# Patient Record
Sex: MALE | Race: BLACK OR AFRICAN AMERICAN | NOT HISPANIC OR LATINO | Employment: FULL TIME | URBAN - METROPOLITAN AREA
[De-identification: names, ages, dates, MRNs, and addresses within clinical notes are randomized per-mention and may not be internally consistent; named-entity substitution may affect disease eponyms.]

---

## 2018-05-01 ENCOUNTER — HOSPITAL ENCOUNTER (EMERGENCY)
Facility: HOSPITAL | Age: 28
Discharge: HOME | End: 2018-05-01
Attending: FAMILY MEDICINE
Payer: COMMERCIAL

## 2018-05-01 VITALS
SYSTOLIC BLOOD PRESSURE: 136 MMHG | WEIGHT: 202 LBS | HEART RATE: 67 BPM | RESPIRATION RATE: 16 BRPM | TEMPERATURE: 98 F | BODY MASS INDEX: 27.36 KG/M2 | HEIGHT: 72 IN | OXYGEN SATURATION: 100 % | DIASTOLIC BLOOD PRESSURE: 87 MMHG

## 2018-05-01 DIAGNOSIS — R53.82 CHRONIC FATIGUE: Primary | ICD-10-CM

## 2018-05-01 DIAGNOSIS — M79.10 MYALGIA: ICD-10-CM

## 2018-05-01 LAB
ANION GAP SERPL CALC-SCNC: 7 MMOL/L
BASOPHILS # BLD AUTO: 0.04 K/UL
BASOPHILS NFR BLD: 0.5 %
BILIRUB UR QL STRIP: NEGATIVE
BUN SERPL-MCNC: 13 MG/DL
CALCIUM SERPL-MCNC: 9 MG/DL
CHLORIDE SERPL-SCNC: 109 MMOL/L
CLARITY UR REFRACT.AUTO: ABNORMAL
CO2 SERPL-SCNC: 27 MMOL/L
COLOR UR AUTO: ABNORMAL
CREAT SERPL-MCNC: 0.9 MG/DL
DIFFERENTIAL METHOD: ABNORMAL
EOSINOPHIL # BLD AUTO: 0.5 K/UL
EOSINOPHIL NFR BLD: 5.2 %
ERYTHROCYTE [DISTWIDTH] IN BLOOD BY AUTOMATED COUNT: 13.4 %
EST. GFR  (AFRICAN AMERICAN): >60 ML/MIN/1.73 M^2
EST. GFR  (NON AFRICAN AMERICAN): >60 ML/MIN/1.73 M^2
GLUCOSE SERPL-MCNC: 112 MG/DL
GLUCOSE UR QL STRIP: NEGATIVE
HCT VFR BLD AUTO: 42.1 %
HGB BLD-MCNC: 13.2 G/DL
HGB UR QL STRIP: NEGATIVE
IMM GRANULOCYTES # BLD AUTO: 0.02 K/UL
IMM GRANULOCYTES NFR BLD AUTO: 0.2 %
KETONES UR QL STRIP: NEGATIVE
LEUKOCYTE ESTERASE UR QL STRIP: NEGATIVE
LYMPHOCYTES # BLD AUTO: 1.8 K/UL
LYMPHOCYTES NFR BLD: 20.5 %
MCH RBC QN AUTO: 26.1 PG
MCHC RBC AUTO-ENTMCNC: 31.4 G/DL
MCV RBC AUTO: 83 FL
MONOCYTES # BLD AUTO: 0.8 K/UL
MONOCYTES NFR BLD: 8.5 %
NEUTROPHILS # BLD AUTO: 5.7 K/UL
NEUTROPHILS NFR BLD: 65.1 %
NITRITE UR QL STRIP: NEGATIVE
NRBC BLD-RTO: 0 /100 WBC
PH UR STRIP: 7 [PH] (ref 5–8)
PLATELET # BLD AUTO: 329 K/UL
PMV BLD AUTO: 9.5 FL
POCT GLUCOSE: 102 MG/DL (ref 70–110)
POTASSIUM SERPL-SCNC: 4 MMOL/L
PROT UR QL STRIP: NEGATIVE
RBC # BLD AUTO: 5.06 M/UL
SODIUM SERPL-SCNC: 143 MMOL/L
SP GR UR STRIP: 1.03 (ref 1–1.03)
TSH SERPL DL<=0.005 MIU/L-ACNC: 0.87 UIU/ML
URN SPEC COLLECT METH UR: ABNORMAL
UROBILINOGEN UR STRIP-ACNC: 2 EU/DL
WBC # BLD AUTO: 8.78 K/UL

## 2018-05-01 PROCEDURE — 85025 COMPLETE CBC W/AUTO DIFF WBC: CPT

## 2018-05-01 PROCEDURE — 84443 ASSAY THYROID STIM HORMONE: CPT

## 2018-05-01 PROCEDURE — 80048 BASIC METABOLIC PNL TOTAL CA: CPT

## 2018-05-01 PROCEDURE — 99283 EMERGENCY DEPT VISIT LOW MDM: CPT | Mod: 25

## 2018-05-01 PROCEDURE — 99282 EMERGENCY DEPT VISIT SF MDM: CPT | Mod: ,,, | Performed by: PHYSICIAN ASSISTANT

## 2018-05-01 PROCEDURE — 25000003 PHARM REV CODE 250: Performed by: PHYSICIAN ASSISTANT

## 2018-05-01 PROCEDURE — 96360 HYDRATION IV INFUSION INIT: CPT

## 2018-05-01 PROCEDURE — 81003 URINALYSIS AUTO W/O SCOPE: CPT

## 2018-05-01 PROCEDURE — 82962 GLUCOSE BLOOD TEST: CPT

## 2018-05-01 RX ADMIN — SODIUM CHLORIDE 1000 ML: 0.9 INJECTION, SOLUTION INTRAVENOUS at 12:05

## 2018-05-01 NOTE — ED PROVIDER NOTES
"Encounter Date: 5/1/2018       History     Chief Complaint   Patient presents with    Generalized Body Aches     tired for months, i got dizzy today at work     26 yo  male with hx of migraine presents today to ED with complain of fatigue, generalized body aches, intermittent headaches, dizziness. As per pt symptoms for more than a month, constant fatigue, on/off generalized body aches, intermittent frontal headaches, unchanged in the past month- pt denies any headaches today. Mother present at ED and worry because Mr Woodruff has been complaining of lack of energy for more than a month, pt usually goes to bed around 9pm and wakes up around 6 am. Pt works at the [employer]. He missed work yesterday but went today. Pt was at work and felt dizzy "like passing out" Pt was sent to ED for further evaluation. Pt states dizziness resolved since he is been waiting at ED. Denies visual changes, speech difficulties, weakness/numbness of extremities, chest pain, sob, abdominal pain, nausea, vomiting, syncope, leg swelling       The history is provided by the patient and a parent. No  was used.     Review of patient's allergies indicates:  No Known Allergies  History reviewed. No pertinent past medical history.  History reviewed. No pertinent surgical history.  Family History   Problem Relation Age of Onset    Migraines Mother      Social History   Substance Use Topics    Smoking status: Never Smoker    Smokeless tobacco: Never Used    Alcohol use No     Review of Systems   Constitutional: Positive for fatigue. Negative for activity change, appetite change, chills, fever and unexpected weight change.   HENT: Negative for rhinorrhea.    Eyes: Negative for visual disturbance.   Respiratory: Negative for cough and shortness of breath.    Cardiovascular: Negative for chest pain and palpitations.   Gastrointestinal: Negative for abdominal pain, diarrhea, nausea and vomiting.   Musculoskeletal: " Positive for myalgias.   Skin: Negative for rash.   Neurological: Positive for dizziness, light-headedness and headaches. Negative for tremors, seizures, syncope, facial asymmetry, speech difficulty, weakness and numbness.   Psychiatric/Behavioral: Negative for self-injury and suicidal ideas.       Physical Exam     Initial Vitals [05/01/18 1040]   BP Pulse Resp Temp SpO2   131/68 76 16 98.4 °F (36.9 °C) 99 %      MAP       89         Physical Exam    Nursing note and vitals reviewed.  Constitutional: He appears well-developed and well-nourished. No distress.   HENT:   Head: Normocephalic and atraumatic.   Eyes: EOM are normal. Pupils are equal, round, and reactive to light.   Neck: Normal range of motion.   Cardiovascular: Normal rate.   Pulmonary/Chest: Breath sounds normal. He has no wheezes. He has no rhonchi. He has no rales.   Abdominal: Soft. Bowel sounds are normal. There is no tenderness. There is no rebound.   Musculoskeletal: Normal range of motion. He exhibits no edema.   Neurological: He is alert and oriented to person, place, and time. He has normal strength. No cranial nerve deficit or sensory deficit. He displays a negative Romberg sign. GCS eye subscore is 4. GCS verbal subscore is 5. GCS motor subscore is 6.   Reflex Scores:       Patellar reflexes are 2+ on the right side and 2+ on the left side.  Skin: Skin is warm. Capillary refill takes less than 2 seconds. No rash noted.   Psychiatric: He has a normal mood and affect.         ED Course   Procedures  Labs Reviewed   CBC W/ AUTO DIFFERENTIAL - Abnormal; Notable for the following:        Result Value    Hemoglobin 13.2 (*)     MCH 26.1 (*)     MCHC 31.4 (*)     All other components within normal limits   BASIC METABOLIC PANEL - Abnormal; Notable for the following:     Glucose 112 (*)     Anion Gap 7 (*)     All other components within normal limits   URINALYSIS, REFLEX TO URINE CULTURE - Abnormal; Notable for the following:     Appearance, UA  Hazy (*)     All other components within normal limits    Narrative:     Preferred Collection Type->Urine, Clean Catch   TSH   POCT GLUCOSE                   APC / Resident Notes:   28 yo AAM with hx of migraine, presents with fatigue, body aches, headache, dizziness for more than a month. No headache symptoms today but pt reports fatigue, dizziness, and body ache, unchanged for more than a month. Denies weight loss, decreased appetite, fever, chills, nausea, vomiting. Physical exam: lungs clear to auscultation, abdomen benign, neuro intact, normal vital signs, no sign of distress  DDx included but not limited to anemia, electrolyte imbalance, thyroid disorder, viral illness, migraine  Order: cbc, bmp, UA, TSH, bolus. Reassess.  Labs: no leuk, Hg slightly low 13.2, glu 112, normal TSH  @1200 pt stable, asymptomatic, neuro intact  Discussed case with supervising physician. Pt will be discharged home with instructions to follow up with PCP for further evaluation on chronic fatigue. Detailed return precautions given to patient. Pt verbalized understanding.                    Clinical Impression:   The primary encounter diagnosis was Chronic fatigue. A diagnosis of Myalgia was also pertinent to this visit.    Disposition:   Disposition: Discharged  Condition: Stable                        Donna Wang PA-C  05/01/18 8038

## 2018-05-01 NOTE — ED TRIAGE NOTES
Presents to ER with weakness, headache, dizziness and generalized body aches for 1 month.  Pt identifiers checked and correct    LOC:   · The pt is awake, alert, and aware of environment with an appropriate affect,  as well as speaking appropriately; AAOx3 to person, place, and situation  APPEARANCE:   · Pt resting comfortably and in no acute distress; clean and well groomed  SKIN:   · Skin is warm and dry; color consistent with ethnicity; pt has normal skin turgor and moist mucus membranes  · Skin intact w/ no breakdown or brusing noted  MUSCULOSKELETAL:   · Pt moving all extremities well; absent of obvious deformities; Ambulates independently  RESPIRATORY:   · Airway is open and patent; respirations are spontaneous; normal effort and rate noted; absent of accessory-muscle use  · Breath sounds auscultated in all lung fields are noted to be clear and absent of adventitious sounds  CARDIAC:   · Pt denies chest pain; normal heart sounds auscultated; Pt has no peripheral edema noted; capillary refill < 3 seconds  · 2+ pulses palpated in all extremities   ABDOMEN:   · Soft and non-tender to palpation; no abnormal distention noted/reported; bowel sounds present x 4  NEUROLOGIC:   · PERRL, 3mm bilaterally, eyes open spontaneously; pt follows commands; facial expression symmetrical; equal hand  bilaterally; purposeful motor response noted  · Normal sensation to touch reported in distal region of all extremities

## 2018-05-01 NOTE — DISCHARGE INSTRUCTIONS
Follow up with your doctor for further evaluation. Call for appointment. Seek prompt medical attention if worsening of symptoms, any health concern, or new symptoms develop.

## 2018-05-09 ENCOUNTER — OFFICE VISIT (OUTPATIENT)
Dept: INTERNAL MEDICINE | Facility: CLINIC | Age: 28
End: 2018-05-09
Payer: COMMERCIAL

## 2018-05-09 VITALS
SYSTOLIC BLOOD PRESSURE: 126 MMHG | DIASTOLIC BLOOD PRESSURE: 88 MMHG | WEIGHT: 211.88 LBS | BODY MASS INDEX: 28.08 KG/M2 | HEIGHT: 73 IN | HEART RATE: 79 BPM

## 2018-05-09 DIAGNOSIS — Z86.69 HX OF MIGRAINE HEADACHES: ICD-10-CM

## 2018-05-09 DIAGNOSIS — R53.83 FATIGUE, UNSPECIFIED TYPE: Primary | ICD-10-CM

## 2018-05-09 DIAGNOSIS — M79.10 MYALGIA: ICD-10-CM

## 2018-05-09 PROCEDURE — 99999 PR PBB SHADOW E&M-EST. PATIENT-LVL III: CPT | Mod: PBBFAC,,,

## 2018-05-09 PROCEDURE — 3008F BODY MASS INDEX DOCD: CPT | Mod: CPTII,S$GLB,,

## 2018-05-09 PROCEDURE — 99203 OFFICE O/P NEW LOW 30 MIN: CPT | Mod: S$GLB,,,

## 2018-05-09 NOTE — PROGRESS NOTES
"Clinic Note  05/09/2018       Subjective:       Patient ID: Orlando Woodruff is a 27 y.o. male being seen for an urgent care visit.    Chief Complaint: Follow-up; Migraine; and Weakness      Migraine    Associated symptoms include photophobia. Pertinent negatives include no coughing, fever, nausea, sore throat or vomiting.     Mr. Woodruff is a 26 yo M with no significant PMHx who is being seen in clinic for follow up after an ED visit for fatigue, body aches, and headaches. Patient states that for the past 3-4 months he has felt more tired, particularly at the end of the day. [place]. 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 No heavy lifting. Fatigue does not interfere with his work duties. States that his body aches are limited to his legs. Describes the pain as an ache that feels as though it goes down to his bones and his legs feel "so tired like they want to give out." Denies low back pain or radiculopathy symptoms. Denies joint pain or swelling. No fever, chills, weight loss, nausea, vomiting, changes in stools, abdominal pain, changes in urine.     Wears heavy rubber steel-toe boots issued by his employer with thin athletic socks. States that he tries to stay hydrated but likely doesn't drink enough water during the day. Goes to sleep around 11 pm, wakes at 6. Does not snore, only occasionally doesn't feel rested upon waking. Works from 7-3:30. Comes home and cares for his 3 children, aged 8, 7, and 1. Lives with his girlfriend and mother who help with childcare. Good food intake throughout the day. Denies depressed mood or any significant emotional stress. Does not have many activities that he does for fun outside of work. No EtOH, tobacco or illicit drug use.    Does have a history of headache. Typically occur 1-2 times per month. Pain is sharp, sudden onset. Occurs in his bilateral eyes and has bandlike distribution around his head. +photophobia, " +phonophobia. No nausea/vomiting. Resolve with taking a nap in a dark, quiet room. Made worse by lack of sleep. Most recent headache was last week.      No past medical history on file.    No past surgical history on file.    Family History   Problem Relation Age of Onset    Migraines Mother        Social History     Social History    Marital status: Single     Spouse name: N/A    Number of children: N/A    Years of education: N/A     Social History Main Topics    Smoking status: Never Smoker    Smokeless tobacco: Never Used    Alcohol use No    Drug use: No    Sexual activity: Yes     Partners: Female     Other Topics Concern    Not on file     Social History Narrative    No narrative on file       Review of Systems   Constitutional: Positive for fatigue. Negative for chills, diaphoresis, fever and unexpected weight change.   HENT: Negative for congestion and sore throat.    Eyes: Positive for photophobia. Negative for visual disturbance.   Respiratory: Negative for cough and shortness of breath.    Cardiovascular: Negative for chest pain and palpitations.   Gastrointestinal: Negative for abdominal distention, constipation, diarrhea, nausea and vomiting.   Endocrine: Negative for polydipsia, polyphagia and polyuria.   Genitourinary: Negative for dysuria.   Musculoskeletal: Positive for myalgias. Negative for arthralgias.   Skin: Negative for wound.   Neurological: Positive for headaches.   Psychiatric/Behavioral: Negative for decreased concentration and dysphoric mood.       Patient's Medications   New Prescriptions    No medications on file   Previous Medications    NAPROXEN SODIUM (ANAPROX) 550 MG TABLET    Take 550 mg by mouth every 12 (twelve) hours.   Modified Medications    No medications on file   Discontinued Medications    No medications on file       Patient Active Problem List    Diagnosis Date Noted    Fatigue 05/09/2018           Objective:      /88 (BP Location: Left arm, Patient  "Position: Sitting, BP Method: Large (Manual))   Pulse 79   Ht 6' 1" (1.854 m)   Wt 96.1 kg (211 lb 13.8 oz)   BMI 27.95 kg/m²   Estimated body mass index is 27.95 kg/m² as calculated from the following:    Height as of this encounter: 6' 1" (1.854 m).    Weight as of this encounter: 96.1 kg (211 lb 13.8 oz).    Physical Exam   Constitutional: He is oriented to person, place, and time. He appears well-developed and well-nourished. No distress.   HENT:   Head: Normocephalic and atraumatic.   Mouth/Throat: Oropharynx is clear and moist. No oropharyngeal exudate.   Eyes: EOM are normal. No scleral icterus.   Neck: Normal range of motion.   Cardiovascular: Normal rate, regular rhythm, normal heart sounds and intact distal pulses.    Pulmonary/Chest: Effort normal and breath sounds normal.   Abdominal: Soft. He exhibits no distension. There is no tenderness.   Musculoskeletal: Normal range of motion. He exhibits no edema.   Neurological: He is alert and oriented to person, place, and time.   Skin: Skin is warm and dry. Capillary refill takes less than 2 seconds.   Psychiatric: He has a normal mood and affect. His behavior is normal.       Assessment:         1. Fatigue, unspecified type     2. Myalgia     3. Hx of migraine headaches           Plan:         1. Fatigue, unspecified type, myalgia  - Patient does not have symptoms consistent with depression, RUPERTO  - Lab workup at ED unremarkable including CBC, CMP, TSH   - Urinalysis consistent with mild dehydration and concentration  - Advised patient to try extra strength Tylenol before work, and once at work as needed  - Advised trying thick socks intended for boots as well as orthotic inserts  - If he does not feel like he is getting restful sleep, can try benadryl PRN  - RTC PRN      No orders of the defined types were placed in this encounter.            Patient seen and plan of care discussed with Dr. Stein.    Austyn Pizano MD  Internal Medicine, " PGY-1

## 2018-05-09 NOTE — PATIENT INSTRUCTIONS
Remember to try the thicker boot socks and shoe inserts to help support your legs.     Take 1 extra strength Tylenol before work, and you can take another one at work as you need it.    If you feel like your sleep isn't restful, you can try a Benadryl before bed to help improve your rest.    If you feel like nothing is improving in the next few weeks, please call the clinic and we can reschedule another appointment for you.

## 2018-09-21 ENCOUNTER — HOSPITAL ENCOUNTER (EMERGENCY)
Facility: HOSPITAL | Age: 28
Discharge: HOME | End: 2018-09-21
Attending: SURGERY
Payer: COMMERCIAL

## 2018-09-21 VITALS
SYSTOLIC BLOOD PRESSURE: 126 MMHG | DIASTOLIC BLOOD PRESSURE: 79 MMHG | BODY MASS INDEX: 27.74 KG/M2 | HEART RATE: 91 BPM | RESPIRATION RATE: 20 BRPM | TEMPERATURE: 98 F | OXYGEN SATURATION: 97 % | HEIGHT: 73 IN | WEIGHT: 209.31 LBS

## 2018-09-21 DIAGNOSIS — R21 RASH: ICD-10-CM

## 2018-09-21 DIAGNOSIS — J06.9 URI, ACUTE: Primary | ICD-10-CM

## 2018-09-21 LAB
DEPRECATED S PYO AG THROAT QL EIA: NEGATIVE
FLUAV AG SPEC QL IA: NEGATIVE
FLUBV AG SPEC QL IA: NEGATIVE
SPECIMEN SOURCE: NORMAL

## 2018-09-21 PROCEDURE — 96372 THER/PROPH/DIAG INJ SC/IM: CPT

## 2018-09-21 PROCEDURE — 87880 STREP A ASSAY W/OPTIC: CPT

## 2018-09-21 PROCEDURE — 99284 EMERGENCY DEPT VISIT MOD MDM: CPT | Mod: 25

## 2018-09-21 PROCEDURE — 87081 CULTURE SCREEN ONLY: CPT

## 2018-09-21 PROCEDURE — 87400 INFLUENZA A/B EACH AG IA: CPT

## 2018-09-21 PROCEDURE — 63600175 PHARM REV CODE 636 W HCPCS: Performed by: PHYSICIAN ASSISTANT

## 2018-09-21 PROCEDURE — 25000242 PHARM REV CODE 250 ALT 637 W/ HCPCS: Performed by: PHYSICIAN ASSISTANT

## 2018-09-21 PROCEDURE — 94640 AIRWAY INHALATION TREATMENT: CPT

## 2018-09-21 RX ORDER — KETOROLAC TROMETHAMINE 30 MG/ML
15 INJECTION, SOLUTION INTRAMUSCULAR; INTRAVENOUS
Status: COMPLETED | OUTPATIENT
Start: 2018-09-21 | End: 2018-09-21

## 2018-09-21 RX ORDER — HYDROCORTISONE VALERATE CREAM 2 MG/G
CREAM TOPICAL 2 TIMES DAILY
Qty: 15 G | Refills: 0 | Status: SHIPPED | OUTPATIENT
Start: 2018-09-21 | End: 2018-10-01

## 2018-09-21 RX ORDER — ALBUTEROL SULFATE 2.5 MG/.5ML
1.25 SOLUTION RESPIRATORY (INHALATION)
Status: COMPLETED | OUTPATIENT
Start: 2018-09-21 | End: 2018-09-21

## 2018-09-21 RX ORDER — ALBUTEROL SULFATE 90 UG/1
1-2 AEROSOL, METERED RESPIRATORY (INHALATION) EVERY 6 HOURS PRN
Qty: 1 INHALER | Refills: 0 | Status: SHIPPED | OUTPATIENT
Start: 2018-09-21 | End: 2020-03-16 | Stop reason: SDUPTHER

## 2018-09-21 RX ADMIN — ALBUTEROL SULFATE 1.28 MG: 2.5 SOLUTION RESPIRATORY (INHALATION) at 04:09

## 2018-09-21 RX ADMIN — KETOROLAC TROMETHAMINE 15 MG: 30 INJECTION, SOLUTION INTRAMUSCULAR at 04:09

## 2018-09-21 NOTE — DISCHARGE INSTRUCTIONS
Your strep test was negative. Your influenza test was negative. Upper respiratory infection of viral etiology.  You are instructed to follow up with her primary care provider for re-evaluation within 3 days.  You are instructed to return to the emergency department immediately for any new or worsening symptoms.

## 2018-09-24 LAB — BACTERIA THROAT CULT: NORMAL

## 2020-03-16 ENCOUNTER — HOSPITAL ENCOUNTER (EMERGENCY)
Facility: HOSPITAL | Age: 30
Discharge: HOME | End: 2020-03-16
Attending: EMERGENCY MEDICINE
Payer: COMMERCIAL

## 2020-03-16 VITALS
HEIGHT: 73 IN | OXYGEN SATURATION: 97 % | BODY MASS INDEX: 29.82 KG/M2 | WEIGHT: 225 LBS | HEART RATE: 87 BPM | TEMPERATURE: 99 F | SYSTOLIC BLOOD PRESSURE: 148 MMHG | DIASTOLIC BLOOD PRESSURE: 90 MMHG | RESPIRATION RATE: 18 BRPM

## 2020-03-16 DIAGNOSIS — J06.9 VIRAL URI WITH COUGH: Primary | ICD-10-CM

## 2020-03-16 DIAGNOSIS — R42 DIZZINESS: ICD-10-CM

## 2020-03-16 LAB
GROUP A STREP, MOLECULAR: NEGATIVE
INFLUENZA A, MOLECULAR: NEGATIVE
INFLUENZA B, MOLECULAR: NEGATIVE
SPECIMEN SOURCE: NORMAL

## 2020-03-16 PROCEDURE — 87651 STREP A DNA AMP PROBE: CPT | Mod: ER

## 2020-03-16 PROCEDURE — 93010 ELECTROCARDIOGRAM REPORT: CPT | Mod: ,,, | Performed by: INTERNAL MEDICINE

## 2020-03-16 PROCEDURE — 99284 EMERGENCY DEPT VISIT MOD MDM: CPT | Mod: 25,ER

## 2020-03-16 PROCEDURE — 93005 ELECTROCARDIOGRAM TRACING: CPT | Mod: ER

## 2020-03-16 PROCEDURE — 87502 INFLUENZA DNA AMP PROBE: CPT | Mod: ER

## 2020-03-16 PROCEDURE — 93010 EKG 12-LEAD: ICD-10-PCS | Mod: ,,, | Performed by: INTERNAL MEDICINE

## 2020-03-16 RX ORDER — ACETAMINOPHEN 500 MG
500 TABLET ORAL EVERY 8 HOURS PRN
Qty: 30 TABLET | Refills: 0 | Status: SHIPPED | OUTPATIENT
Start: 2020-03-16

## 2020-03-16 RX ORDER — BENZONATATE 100 MG/1
100 CAPSULE ORAL 3 TIMES DAILY PRN
Qty: 30 CAPSULE | Refills: 0 | Status: SHIPPED | OUTPATIENT
Start: 2020-03-16 | End: 2020-03-26

## 2020-03-16 RX ORDER — ALBUTEROL SULFATE 90 UG/1
1-2 AEROSOL, METERED RESPIRATORY (INHALATION) EVERY 6 HOURS PRN
Qty: 6.7 G | Refills: 0 | Status: SHIPPED | OUTPATIENT
Start: 2020-03-16 | End: 2021-03-16

## 2020-03-17 NOTE — ED PROVIDER NOTES
Encounter Date: 3/16/2020       History     Chief Complaint   Patient presents with    Sore Throat     Pt with c/o sore throat, headache, dizziness and cough that started today. Pt denies fever.     Headache    Dizziness    Cough     The patient is a 29 year-old male with a past medical history of migraines who presents to the ED complaining of sore throat, headache, dizziness, and cough that began today.  No known fever.  No known sick contacts.  No chest pain or shortness of breath.  Tolerating oral intake.  No treatment attempted prior to arrival.      The history is provided by the patient.     Review of patient's allergies indicates:  No Known Allergies  No past medical history on file.  No past surgical history on file.  Family History   Problem Relation Age of Onset    Migraines Mother      Social History     Tobacco Use    Smoking status: Never Smoker    Smokeless tobacco: Never Used   Substance Use Topics    Alcohol use: No    Drug use: No     Review of Systems   Constitutional: Negative for fever.   HENT: Positive for sore throat.    Respiratory: Positive for cough. Negative for shortness of breath.    Cardiovascular: Negative for chest pain.   Gastrointestinal: Negative for nausea.   Genitourinary: Negative for dysuria.   Musculoskeletal: Negative for back pain.   Skin: Negative for rash.   Neurological: Positive for dizziness and headaches. Negative for weakness.   Hematological: Does not bruise/bleed easily.       Physical Exam     Initial Vitals [03/16/20 2024]   BP Pulse Resp Temp SpO2   (!) 148/90 87 18 98.6 °F (37 °C) 97 %      MAP       --         Physical Exam    Nursing note and vitals reviewed.  Constitutional: He appears well-developed and well-nourished.  Non-toxic appearance. No distress.   The patient is alert, oriented, and speaking in complete sentences.  No apparent distress.  He is nontoxic-appearing.   HENT:   Head: Normocephalic and atraumatic.   Right Ear: Hearing, tympanic  membrane, external ear and ear canal normal.   Left Ear: Hearing, tympanic membrane, external ear and ear canal normal.   Nose: Mucosal edema present.   Mouth/Throat: Uvula is midline and mucous membranes are normal. Posterior oropharyngeal erythema present. No oropharyngeal exudate, posterior oropharyngeal edema or tonsillar abscesses.   No significant oropharyngeal edema.  No exudate.   Eyes: Conjunctivae and EOM are normal.   Neck: Full passive range of motion without pain. Neck supple.   No meningismus   Cardiovascular: Normal rate, regular rhythm, normal heart sounds and normal pulses. Exam reveals no gallop and no friction rub.    No murmur heard.  Pulmonary/Chest: Effort normal and breath sounds normal. No respiratory distress. He has no wheezes. He has no rhonchi. He has no rales.   Musculoskeletal: Normal range of motion.   Neurological: He is alert and oriented to person, place, and time. He has normal strength. Gait normal. GCS eye subscore is 4. GCS verbal subscore is 5. GCS motor subscore is 6.   Skin: Skin is warm, dry and intact. Capillary refill takes less than 2 seconds. No rash noted.   Psychiatric: He has a normal mood and affect. His speech is normal and behavior is normal. Judgment and thought content normal. Cognition and memory are normal.         ED Course   Procedures  Labs Reviewed - No data to display       Imaging Results    None          Medical Decision Making:   History:   Old Medical Records: I decided to obtain old medical records.  Clinical Tests:   Lab Tests: Ordered and Reviewed  Medical Tests: Ordered and Reviewed  ED Management:  After complete evaluation, including thorough history and physical exam, the patient's symptoms are most likely due to viral syndrome.  Rapid flu and rapid strep swabs are both negative.  ECG does not demonstrate evidence of acute ischemia or dysrhythmia.  There are no concerning features on physical exam to suggest bacterial otitis media/externa,  sinusitis, pharyngitis, or peritonsillar abscess. Vital signs do not suggest sepsis. Lung sounds are clear and not consistent with pneumonia. There is no neck pain or limited ROM to suggest retropharyngeal abscess or meningitis. The patient will be treated with supportive care.    Patient does not currently meet criteria for COVID-19 testing.    Plan of care discussed with the patient and they voiced understanding and agreement.  Patient advised to follow-up with PCP in 2 days for further evaluation.  Patient was given specific return precautions.  Patient was stable for discharge.  I discussed this patient and the plan of care with my attending physician.                   ED Course as of Mar 16 2152   Mon Mar 16, 2020   2101 EKG:  Rate of 79.  Normal sinus rhythm.  No STEMI no ectopy.    [RN]      ED Course User Index  [RN] Italo Martin Jr., MD                Clinical Impression:       ICD-10-CM ICD-9-CM   1. Viral URI with cough J06.9 465.9    B97.89    2. Dizziness R42 780.4                                Svetlana Matute NP  03/16/20 1306

## 2021-04-25 ENCOUNTER — HOSPITAL ENCOUNTER (EMERGENCY)
Facility: HOSPITAL | Age: 31
Discharge: HOME | End: 2021-04-25
Attending: EMERGENCY MEDICINE
Payer: COMMERCIAL

## 2021-04-25 VITALS
BODY MASS INDEX: 29.26 KG/M2 | HEART RATE: 83 BPM | WEIGHT: 228 LBS | HEIGHT: 74 IN | SYSTOLIC BLOOD PRESSURE: 135 MMHG | RESPIRATION RATE: 20 BRPM | OXYGEN SATURATION: 99 % | DIASTOLIC BLOOD PRESSURE: 90 MMHG | TEMPERATURE: 99 F

## 2021-04-25 DIAGNOSIS — H60.503 ACUTE OTITIS EXTERNA OF BOTH EARS, UNSPECIFIED TYPE: Primary | ICD-10-CM

## 2021-04-25 PROCEDURE — 99283 EMERGENCY DEPT VISIT LOW MDM: CPT | Mod: ER

## 2021-04-25 RX ORDER — NEOMYCIN SULFATE, POLYMYXIN B SULFATE AND HYDROCORTISONE 10; 3.5; 1 MG/ML; MG/ML; [USP'U]/ML
4 SUSPENSION/ DROPS AURICULAR (OTIC) 3 TIMES DAILY
Qty: 10 ML | Refills: 0 | Status: SHIPPED | OUTPATIENT
Start: 2021-04-25 | End: 2021-05-05

## 2021-07-01 ENCOUNTER — PATIENT MESSAGE (OUTPATIENT)
Dept: ADMINISTRATIVE | Facility: OTHER | Age: 31
End: 2021-07-01

## 2022-08-11 ENCOUNTER — HOSPITAL ENCOUNTER (EMERGENCY)
Facility: HOSPITAL | Age: 32
Discharge: HOME | End: 2022-08-11
Attending: EMERGENCY MEDICINE
Payer: COMMERCIAL

## 2022-08-11 VITALS
RESPIRATION RATE: 20 BRPM | HEART RATE: 90 BPM | OXYGEN SATURATION: 98 % | DIASTOLIC BLOOD PRESSURE: 79 MMHG | TEMPERATURE: 99 F | SYSTOLIC BLOOD PRESSURE: 133 MMHG | HEIGHT: 75 IN | WEIGHT: 246 LBS | BODY MASS INDEX: 30.59 KG/M2

## 2022-08-11 DIAGNOSIS — M25.511 ACUTE PAIN OF RIGHT SHOULDER: Primary | ICD-10-CM
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
## 2022-08-11 PROCEDURE — 99283 EMERGENCY DEPT VISIT LOW MDM: CPT | Mod: ER

## 2022-08-11 RX ORDER — NAPROXEN 500 MG/1
500 TABLET ORAL 2 TIMES DAILY WITH MEALS
Qty: 30 TABLET | Refills: 0 | OUTPATIENT
Start: 2022-08-11 | End: 2022-10-24

## 2022-08-11 NOTE — ED PROVIDER NOTES
Encounter Date: 8/11/2022       History     Chief Complaint   Patient presents with    Shoulder Injury     Pt reports falling yesterday and injuring right shoulder, unsure of how he fell but was at work. Reports numbness and tingling going down arm. No meds. Not on blood thinners. Did not hit head.      32-year-old male presents to ED with concern of right-sided shoulder pain after slip and fall that occurred yesterday while at work.  No head injury.  No LOC.  He was able to get back to standing position but noticed immediate pain in his right shoulder described as sharp, worse with movement, alleviated with rest, severity 5/10.  No numbness focal weakness.  No other reported injuries.  No other acute complaints at this time.    The history is provided by the patient.     Review of patient's allergies indicates:  No Known Allergies  Past Medical History:   Diagnosis Date    Asthma      History reviewed. No pertinent surgical history.  Family History   Problem Relation Age of Onset    Migraines Mother      Social History     Tobacco Use    Smoking status: Never Smoker    Smokeless tobacco: Never Used   Substance Use Topics    Alcohol use: No    Drug use: No     Review of Systems   Musculoskeletal: Positive for arthralgias.   Skin: Negative for color change and wound.   Neurological: Negative for dizziness, weakness, light-headedness and numbness.       Physical Exam     Initial Vitals [08/11/22 1422]   BP Pulse Resp Temp SpO2   (!) 139/91 98 19 99 °F (37.2 °C) 99 %      MAP       --         Physical Exam    Nursing note and vitals reviewed.  Constitutional: He appears well-developed and well-nourished. He is active. He does not have a sickly appearance. He does not appear ill. No distress.   HENT:   Head: Normocephalic and atraumatic.   Neck:   Normal range of motion.  Musculoskeletal:      Cervical back: Normal range of motion.      Comments: Right shoulder tenderness over anterior aspect.  No physical or  palpable deformities.  ROM is intact but pain worsens     Neurological: He is alert. GCS eye subscore is 4. GCS verbal subscore is 5. GCS motor subscore is 6.   Skin: Skin is warm and dry.   Psychiatric: He has a normal mood and affect. His speech is normal and behavior is normal.         ED Course   Procedures  Labs Reviewed - No data to display       Imaging Results          X-ray Shoulder 2 or More Views Right (Final result)  Result time 08/11/22 15:01:44   Procedure changed from X-Ray Shoulder Trauma Right     Final result by DIETER Reynoso Sr., MD (08/11/22 15:01:44)                 Impression:      Normal study.      Electronically signed by: Anthony Reynoso MD  Date:    08/11/2022  Time:    15:01             Narrative:    EXAMINATION:  XR SHOULDER COMPLETE 2 OR MORE VIEWS RIGHT    CLINICAL HISTORY:  Unspecified fall, initial encounterpain;    COMPARISON:  None    FINDINGS:  There is no fracture. There is no dislocation.                                 Medications - No data to display  Medical Decision Making:   Initial Assessment:   Patient presents with concern of right-sided shoulder pain after slip and fall that occurred yesterday while at work.  No head injury.  No LOC.  Denies numbness or focal weakness.  On exam, reproducible tenderness over anterior right shoulder with no physical or palpable deformities.  Neurovascular intact.  Differential Diagnosis:   Fall, contusion, strain, sprain, dislocation, fracture  ED Management:  X-ray right shoulder    Imaging right shoulder showing no acute bony abnormalities.  Will continue with conservative care.  Prescription for naproxen.  Encouraged addition of Tylenol as needed, RICE, activities and movements as tolerated, PCP follow-up.  Patient states his understanding and agrees with plan.                      Clinical Impression:   Final diagnoses:  [W19.XXXA] Fall  [M25.511] Acute pain of right shoulder (Primary)          ED Disposition Condition    Discharge  Stable        ED Prescriptions     Medication Sig Dispense Start Date End Date Auth. Provider    naproxen (NAPROSYN) 500 MG tablet Take 1 tablet (500 mg total) by mouth 2 (two) times daily with meals. 30 tablet 8/11/2022  Darryn Resendiz PA-C        Follow-up Information     Follow up With Specialties Details Why Contact Info    Nieves Solo MD Internal Medicine   [address] [place] [id number] [phone]             Darryn Resendiz PA-C  08/11/22 0867

## 2022-08-11 NOTE — Clinical Note
"Orlando Tate" Ranjan was seen and treated in our emergency department on 8/11/2022.  He may return to work on 08/12/2022.       If you have any questions or concerns, please don't hesitate to call.      FEDERICO Pimentel RN    "

## 2022-10-24 ENCOUNTER — HOSPITAL ENCOUNTER (EMERGENCY)
Facility: HOSPITAL | Age: 32
Discharge: HOME | End: 2022-10-24
Attending: EMERGENCY MEDICINE
Payer: COMMERCIAL

## 2022-10-24 VITALS
BODY MASS INDEX: 31.1 KG/M2 | HEIGHT: 73 IN | SYSTOLIC BLOOD PRESSURE: 122 MMHG | HEART RATE: 80 BPM | DIASTOLIC BLOOD PRESSURE: 72 MMHG | WEIGHT: 234.69 LBS | RESPIRATION RATE: 16 BRPM | OXYGEN SATURATION: 100 % | TEMPERATURE: 99 F

## 2022-10-24 DIAGNOSIS — H16.133 WELDERS' KERATITIS OF BOTH EYES: Primary | ICD-10-CM

## 2022-10-24 PROCEDURE — 99284 EMERGENCY DEPT VISIT MOD MDM: CPT | Mod: ER

## 2022-10-24 PROCEDURE — 25000003 PHARM REV CODE 250: Mod: ER | Performed by: EMERGENCY MEDICINE

## 2022-10-24 RX ORDER — ERYTHROMYCIN 5 MG/G
OINTMENT OPHTHALMIC EVERY 4 HOURS
Qty: 3.5 G | Refills: 0 | Status: SHIPPED | OUTPATIENT
Start: 2022-10-24 | End: 2022-10-31

## 2022-10-24 RX ORDER — HYDROCODONE BITARTRATE AND ACETAMINOPHEN 5; 325 MG/1; MG/1
1 TABLET ORAL
Status: COMPLETED | OUTPATIENT
Start: 2022-10-24 | End: 2022-10-24

## 2022-10-24 RX ORDER — HYDROCODONE BITARTRATE AND ACETAMINOPHEN 5; 325 MG/1; MG/1
1 TABLET ORAL EVERY 4 HOURS PRN
Qty: 18 TABLET | Refills: 0 | Status: SHIPPED | OUTPATIENT
Start: 2022-10-24

## 2022-10-24 RX ORDER — NAPROXEN 500 MG/1
500 TABLET ORAL 2 TIMES DAILY PRN
Qty: 30 TABLET | Refills: 0 | Status: SHIPPED | OUTPATIENT
Start: 2022-10-24 | End: 2022-10-24 | Stop reason: SDUPTHER

## 2022-10-24 RX ORDER — ERYTHROMYCIN 5 MG/G
OINTMENT OPHTHALMIC EVERY 4 HOURS
Qty: 3.5 G | Refills: 0 | Status: SHIPPED | OUTPATIENT
Start: 2022-10-24 | End: 2022-10-24 | Stop reason: SDUPTHER

## 2022-10-24 RX ORDER — KETOROLAC TROMETHAMINE 10 MG/1
10 TABLET, FILM COATED ORAL
Status: COMPLETED | OUTPATIENT
Start: 2022-10-24 | End: 2022-10-24

## 2022-10-24 RX ORDER — NAPROXEN 500 MG/1
500 TABLET ORAL 2 TIMES DAILY PRN
Qty: 30 TABLET | Refills: 0 | Status: SHIPPED | OUTPATIENT
Start: 2022-10-24

## 2022-10-24 RX ORDER — HYDROCODONE BITARTRATE AND ACETAMINOPHEN 5; 325 MG/1; MG/1
1 TABLET ORAL EVERY 4 HOURS PRN
Qty: 18 TABLET | Refills: 0 | Status: SHIPPED | OUTPATIENT
Start: 2022-10-24 | End: 2022-10-24 | Stop reason: SDUPTHER

## 2022-10-24 RX ADMIN — HYDROCODONE BITARTRATE AND ACETAMINOPHEN 1 TABLET: 5; 325 TABLET ORAL at 08:10

## 2022-10-24 RX ADMIN — KETOROLAC TROMETHAMINE 10 MG: 10 TABLET, FILM COATED ORAL at 08:10

## 2022-10-24 RX ADMIN — FLUORESCEIN SODIUM 1 EACH: 1 STRIP OPHTHALMIC at 08:10

## 2022-10-24 NOTE — DISCHARGE INSTRUCTIONS
Cold compress  Pain medicine as needed  No driving or operating heavy machinery while taking hydrocodone  Follow up with ophthalmology asap.    Thank you for coming in to see us at [facility]! It was nice to meet you, and I hope you feel better soon. Please feel free to return to the ER at any time should your symptoms get worse, or if you have different emergent concerns.    Our goal in the emergency department is to always give you outstanding care and exceptional service. You may receive a survey by mail or e-mail in the next week regarding your experience in our ED. We would greatly appreciate your completing and returning the survey. Your feedback provides us with a way to recognize our staff who give very good care and it helps us learn how to improve when your experience was below our aspiration of excellence.       Sincerely,    Yeison Mercer MD  Medical Director  Emergency Department  [facility] EDs  [facility]

## 2022-10-24 NOTE — Clinical Note
"Orlando Magallanes "Violetta Woodruff was seen and treated in our emergency department on 10/24/2022.  He may return to work on 10/27/2022.       If you have any questions or concerns, please don't hesitate to call.      Yeison Mercer MD"
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
## 2022-12-02 ENCOUNTER — HOSPITAL ENCOUNTER (EMERGENCY)
Facility: HOSPITAL | Age: 32
Discharge: HOME | End: 2022-12-02
Attending: EMERGENCY MEDICINE
Payer: COMMERCIAL

## 2022-12-02 VITALS
TEMPERATURE: 98 F | HEIGHT: 73 IN | BODY MASS INDEX: 35.78 KG/M2 | WEIGHT: 270 LBS | DIASTOLIC BLOOD PRESSURE: 83 MMHG | HEART RATE: 80 BPM | SYSTOLIC BLOOD PRESSURE: 140 MMHG | OXYGEN SATURATION: 97 % | RESPIRATION RATE: 18 BRPM

## 2022-12-02 DIAGNOSIS — S05.00XA: Primary | ICD-10-CM

## 2022-12-02 PROCEDURE — 25000003 PHARM REV CODE 250: Mod: ER | Performed by: EMERGENCY MEDICINE

## 2022-12-02 PROCEDURE — 99283 EMERGENCY DEPT VISIT LOW MDM: CPT | Mod: ER

## 2022-12-02 RX ORDER — PROPARACAINE HYDROCHLORIDE 5 MG/ML
1 SOLUTION/ DROPS OPHTHALMIC
Status: COMPLETED | OUTPATIENT
Start: 2022-12-02 | End: 2022-12-02

## 2022-12-02 RX ORDER — AMLODIPINE BESYLATE 5 MG/1
5 TABLET ORAL
COMMUNITY
Start: 2022-08-02

## 2022-12-02 RX ORDER — ERYTHROMYCIN 5 MG/G
OINTMENT OPHTHALMIC
Qty: 1 G | Refills: 0 | Status: SHIPPED | OUTPATIENT
Start: 2022-12-02

## 2022-12-02 RX ADMIN — FLUORESCEIN SODIUM 1 EACH: 1 STRIP OPHTHALMIC at 01:12

## 2022-12-02 RX ADMIN — PROPARACAINE HYDROCHLORIDE 1 DROP: 5 SOLUTION/ DROPS OPHTHALMIC at 01:12

## 2022-12-02 NOTE — DISCHARGE INSTRUCTIONS
You have scratches on the white parts of your eyes. Use the antibiotic ointment and saline if you feel dry. Do not buy visine or anything to treat redness. You can take Tylenol 1 gram every 8 hours, and ibuprofen 800 mg every 8 hours; this means you can take pain medicine once every 4 hours.
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
## 2023-04-19 ENCOUNTER — PATIENT MESSAGE (OUTPATIENT)
Dept: RESEARCH | Facility: HOSPITAL | Age: 33
End: 2023-04-19
Payer: COMMERCIAL

## 2024-02-27 NOTE — DISCHARGE INSTRUCTIONS
